# Patient Record
Sex: MALE | Race: WHITE | Employment: OTHER | URBAN - METROPOLITAN AREA
[De-identification: names, ages, dates, MRNs, and addresses within clinical notes are randomized per-mention and may not be internally consistent; named-entity substitution may affect disease eponyms.]

---

## 2023-12-19 ENCOUNTER — APPOINTMENT (OUTPATIENT)
Dept: GENERAL RADIOLOGY | Age: 68
DRG: 871 | End: 2023-12-19
Attending: INTERNAL MEDICINE
Payer: MEDICAID

## 2023-12-19 ENCOUNTER — HOSPITAL ENCOUNTER (INPATIENT)
Age: 68
LOS: 3 days | Discharge: NURSING, REHAB OR LONG-TERM CARE | DRG: 871 | End: 2023-12-22
Attending: INTERNAL MEDICINE | Admitting: INTERNAL MEDICINE
Payer: MEDICAID

## 2023-12-19 DIAGNOSIS — G89.29 OTHER CHRONIC PAIN: Primary | ICD-10-CM

## 2023-12-19 PROBLEM — J96.20 ACUTE ON CHRONIC RESPIRATORY FAILURE (HCC): Status: ACTIVE | Noted: 2023-12-19

## 2023-12-19 LAB
ANION GAP SERPL CALCULATED.3IONS-SCNC: 10 MMOL/L (ref 3–16)
APTT BLD: 34.4 SEC (ref 22.7–35.9)
BASE EXCESS BLDA CALC-SCNC: -3.7 MMOL/L (ref -3–3)
BASOPHILS # BLD: 0.1 K/UL (ref 0–0.2)
BASOPHILS NFR BLD: 0.4 %
BILIRUB UR QL STRIP.AUTO: NEGATIVE
BUN SERPL-MCNC: 30 MG/DL (ref 7–20)
CALCIUM SERPL-MCNC: 8.3 MG/DL (ref 8.3–10.6)
CHLORIDE SERPL-SCNC: 102 MMOL/L (ref 99–110)
CK SERPL-CCNC: 268 U/L (ref 39–308)
CLARITY UR: CLEAR
CO2 BLDA-SCNC: 25.7 MMOL/L
CO2 SERPL-SCNC: 21 MMOL/L (ref 21–32)
COHGB MFR BLDA: 0.8 % (ref 0–1.5)
COLOR UR: YELLOW
CREAT SERPL-MCNC: 0.8 MG/DL (ref 0.8–1.3)
DEPRECATED RDW RBC AUTO: 16.1 % (ref 12.4–15.4)
EOSINOPHIL # BLD: 0 K/UL (ref 0–0.6)
EOSINOPHIL NFR BLD: 0 %
FLUAV RNA RESP QL NAA+PROBE: NOT DETECTED
FLUBV RNA RESP QL NAA+PROBE: NOT DETECTED
GFR SERPLBLD CREATININE-BSD FMLA CKD-EPI: >60 ML/MIN/{1.73_M2}
GLUCOSE BLD-MCNC: 131 MG/DL (ref 70–99)
GLUCOSE BLD-MCNC: 158 MG/DL (ref 70–99)
GLUCOSE SERPL-MCNC: 132 MG/DL (ref 70–99)
GLUCOSE UR STRIP.AUTO-MCNC: NEGATIVE MG/DL
HCO3 BLDA-SCNC: 24 MMOL/L (ref 21–29)
HCT VFR BLD AUTO: 33.3 % (ref 40.5–52.5)
HGB BLD-MCNC: 10.8 G/DL (ref 13.5–17.5)
HGB BLDA-MCNC: 11.9 G/DL (ref 13.5–17.5)
HGB UR QL STRIP.AUTO: NEGATIVE
INR PPP: 1.22 (ref 0.84–1.16)
KETONES UR STRIP.AUTO-MCNC: NEGATIVE MG/DL
LACTATE BLDV-SCNC: 0.9 MMOL/L (ref 0.4–2)
LEUKOCYTE ESTERASE UR QL STRIP.AUTO: NEGATIVE
LYMPHOCYTES # BLD: 0.4 K/UL (ref 1–5.1)
LYMPHOCYTES NFR BLD: 1 %
MCH RBC QN AUTO: 29.7 PG (ref 26–34)
MCHC RBC AUTO-ENTMCNC: 32.3 G/DL (ref 31–36)
MCV RBC AUTO: 91.8 FL (ref 80–100)
METHGB MFR BLDA: 0.2 %
MONOCYTES # BLD: 0.3 K/UL (ref 0–1.3)
MONOCYTES NFR BLD: 0.8 %
NEUTROPHILS # BLD: 34.4 K/UL (ref 1.7–7.7)
NEUTROPHILS NFR BLD: 97.8 %
NITRITE UR QL STRIP.AUTO: NEGATIVE
NT-PROBNP SERPL-MCNC: 674 PG/ML (ref 0–124)
O2 CT VFR BLDA CALC: 16 ML/DL
O2 THERAPY: ABNORMAL
PCO2 BLDA: 55.6 MMHG (ref 35–45)
PERFORMED ON: ABNORMAL
PERFORMED ON: ABNORMAL
PH BLDA: 7.25 [PH] (ref 7.35–7.45)
PH UR STRIP.AUTO: 6 [PH] (ref 5–8)
PLATELET # BLD AUTO: 494 K/UL (ref 135–450)
PMV BLD AUTO: 7.4 FL (ref 5–10.5)
PO2 BLDA: 97 MMHG (ref 75–108)
POTASSIUM SERPL-SCNC: 5.9 MMOL/L (ref 3.5–5.1)
PROCALCITONIN SERPL IA-MCNC: 0.54 NG/ML (ref 0–0.15)
PROT UR STRIP.AUTO-MCNC: NEGATIVE MG/DL
PROTHROMBIN TIME: 15.4 SEC (ref 11.5–14.8)
RBC # BLD AUTO: 3.63 M/UL (ref 4.2–5.9)
SAO2 % BLDA: 96.2 %
SARS-COV-2 RNA RESP QL NAA+PROBE: NOT DETECTED
SODIUM SERPL-SCNC: 133 MMOL/L (ref 136–145)
SP GR UR STRIP.AUTO: 1.02 (ref 1–1.03)
TROPONIN, HIGH SENSITIVITY: 19 NG/L (ref 0–22)
UA COMPLETE W REFLEX CULTURE PNL UR: NORMAL
UA DIPSTICK W REFLEX MICRO PNL UR: NORMAL
URN SPEC COLLECT METH UR: NORMAL
UROBILINOGEN UR STRIP-ACNC: 0.2 E.U./DL
WBC # BLD AUTO: 35.2 K/UL (ref 4–11)

## 2023-12-19 PROCEDURE — 2580000003 HC RX 258

## 2023-12-19 PROCEDURE — 6370000000 HC RX 637 (ALT 250 FOR IP): Performed by: INTERNAL MEDICINE

## 2023-12-19 PROCEDURE — 87040 BLOOD CULTURE FOR BACTERIA: CPT

## 2023-12-19 PROCEDURE — 87641 MR-STAPH DNA AMP PROBE: CPT

## 2023-12-19 PROCEDURE — 80048 BASIC METABOLIC PNL TOTAL CA: CPT

## 2023-12-19 PROCEDURE — 85610 PROTHROMBIN TIME: CPT

## 2023-12-19 PROCEDURE — 36415 COLL VENOUS BLD VENIPUNCTURE: CPT

## 2023-12-19 PROCEDURE — 85025 COMPLETE CBC W/AUTO DIFF WBC: CPT

## 2023-12-19 PROCEDURE — 2700000000 HC OXYGEN THERAPY PER DAY

## 2023-12-19 PROCEDURE — 82803 BLOOD GASES ANY COMBINATION: CPT

## 2023-12-19 PROCEDURE — 87070 CULTURE OTHR SPECIMN AEROBIC: CPT

## 2023-12-19 PROCEDURE — 81003 URINALYSIS AUTO W/O SCOPE: CPT

## 2023-12-19 PROCEDURE — 83880 ASSAY OF NATRIURETIC PEPTIDE: CPT

## 2023-12-19 PROCEDURE — 87449 NOS EACH ORGANISM AG IA: CPT

## 2023-12-19 PROCEDURE — 2580000003 HC RX 258: Performed by: INTERNAL MEDICINE

## 2023-12-19 PROCEDURE — 94761 N-INVAS EAR/PLS OXIMETRY MLT: CPT

## 2023-12-19 PROCEDURE — 6360000002 HC RX W HCPCS

## 2023-12-19 PROCEDURE — 5A09457 ASSISTANCE WITH RESPIRATORY VENTILATION, 24-96 CONSECUTIVE HOURS, CONTINUOUS POSITIVE AIRWAY PRESSURE: ICD-10-PCS | Performed by: INTERNAL MEDICINE

## 2023-12-19 PROCEDURE — 87636 SARSCOV2 & INF A&B AMP PRB: CPT

## 2023-12-19 PROCEDURE — 87205 SMEAR GRAM STAIN: CPT

## 2023-12-19 PROCEDURE — 84145 PROCALCITONIN (PCT): CPT

## 2023-12-19 PROCEDURE — 84484 ASSAY OF TROPONIN QUANT: CPT

## 2023-12-19 PROCEDURE — 71045 X-RAY EXAM CHEST 1 VIEW: CPT

## 2023-12-19 PROCEDURE — 6370000000 HC RX 637 (ALT 250 FOR IP)

## 2023-12-19 PROCEDURE — 85730 THROMBOPLASTIN TIME PARTIAL: CPT

## 2023-12-19 PROCEDURE — 83605 ASSAY OF LACTIC ACID: CPT

## 2023-12-19 PROCEDURE — 80307 DRUG TEST PRSMV CHEM ANLYZR: CPT

## 2023-12-19 PROCEDURE — 93005 ELECTROCARDIOGRAM TRACING: CPT | Performed by: INTERNAL MEDICINE

## 2023-12-19 PROCEDURE — 94640 AIRWAY INHALATION TREATMENT: CPT

## 2023-12-19 PROCEDURE — 94660 CPAP INITIATION&MGMT: CPT

## 2023-12-19 PROCEDURE — 82550 ASSAY OF CK (CPK): CPT

## 2023-12-19 PROCEDURE — 87633 RESP VIRUS 12-25 TARGETS: CPT

## 2023-12-19 PROCEDURE — 2000000000 HC ICU R&B

## 2023-12-19 RX ORDER — OMEPRAZOLE 20 MG/1
20 CAPSULE, DELAYED RELEASE ORAL DAILY
COMMUNITY

## 2023-12-19 RX ORDER — ACETAMINOPHEN 325 MG/1
650 TABLET ORAL EVERY 6 HOURS PRN
Status: DISCONTINUED | OUTPATIENT
Start: 2023-12-19 | End: 2023-12-19

## 2023-12-19 RX ORDER — IPRATROPIUM BROMIDE AND ALBUTEROL SULFATE 2.5; .5 MG/3ML; MG/3ML
1 SOLUTION RESPIRATORY (INHALATION)
Status: DISCONTINUED | OUTPATIENT
Start: 2023-12-20 | End: 2023-12-22 | Stop reason: HOSPADM

## 2023-12-19 RX ORDER — POLYETHYLENE GLYCOL 3350 17 G/17G
17 POWDER, FOR SOLUTION ORAL DAILY PRN
Status: DISCONTINUED | OUTPATIENT
Start: 2023-12-19 | End: 2023-12-22 | Stop reason: HOSPADM

## 2023-12-19 RX ORDER — HYDROCODONE BITARTRATE AND ACETAMINOPHEN 5; 325 MG/1; MG/1
1 TABLET ORAL EVERY 6 HOURS PRN
Status: ON HOLD | COMMUNITY
End: 2023-12-22

## 2023-12-19 RX ORDER — POTASSIUM CHLORIDE 750 MG/1
40 TABLET, EXTENDED RELEASE ORAL PRN
Status: DISCONTINUED | OUTPATIENT
Start: 2023-12-19 | End: 2023-12-22 | Stop reason: HOSPADM

## 2023-12-19 RX ORDER — LISINOPRIL 20 MG/1
20 TABLET ORAL DAILY
COMMUNITY

## 2023-12-19 RX ORDER — FLUTICASONE PROPIONATE AND SALMETEROL XINAFOATE 45; 21 UG/1; UG/1
2 AEROSOL, METERED RESPIRATORY (INHALATION) 2 TIMES DAILY
Status: ON HOLD | COMMUNITY
End: 2023-12-22 | Stop reason: HOSPADM

## 2023-12-19 RX ORDER — HYDROXYZINE PAMOATE 25 MG/1
25 CAPSULE ORAL 3 TIMES DAILY PRN
Status: ON HOLD | COMMUNITY
End: 2023-12-22 | Stop reason: HOSPADM

## 2023-12-19 RX ORDER — MAGNESIUM SULFATE 1 G/100ML
1000 INJECTION INTRAVENOUS PRN
Status: DISCONTINUED | OUTPATIENT
Start: 2023-12-19 | End: 2023-12-22 | Stop reason: HOSPADM

## 2023-12-19 RX ORDER — SODIUM CHLORIDE 9 MG/ML
INJECTION, SOLUTION INTRAVENOUS PRN
Status: DISCONTINUED | OUTPATIENT
Start: 2023-12-19 | End: 2023-12-22 | Stop reason: HOSPADM

## 2023-12-19 RX ORDER — ACETAMINOPHEN 650 MG/1
650 SUPPOSITORY RECTAL EVERY 6 HOURS PRN
Status: DISCONTINUED | OUTPATIENT
Start: 2023-12-19 | End: 2023-12-22 | Stop reason: HOSPADM

## 2023-12-19 RX ORDER — GLUCAGON 1 MG/ML
1 KIT INJECTION PRN
Status: DISCONTINUED | OUTPATIENT
Start: 2023-12-19 | End: 2023-12-22 | Stop reason: HOSPADM

## 2023-12-19 RX ORDER — DIAZEPAM 5 MG/1
5 TABLET ORAL NIGHTLY
Status: ON HOLD | COMMUNITY
End: 2023-12-22 | Stop reason: HOSPADM

## 2023-12-19 RX ORDER — BUDESONIDE AND FORMOTEROL FUMARATE DIHYDRATE 80; 4.5 UG/1; UG/1
2 AEROSOL RESPIRATORY (INHALATION) 2 TIMES DAILY
COMMUNITY

## 2023-12-19 RX ORDER — DEXTROSE MONOHYDRATE 100 MG/ML
INJECTION, SOLUTION INTRAVENOUS CONTINUOUS PRN
Status: DISCONTINUED | OUTPATIENT
Start: 2023-12-19 | End: 2023-12-22 | Stop reason: HOSPADM

## 2023-12-19 RX ORDER — SODIUM CHLORIDE 0.9 % (FLUSH) 0.9 %
10 SYRINGE (ML) INJECTION PRN
Status: DISCONTINUED | OUTPATIENT
Start: 2023-12-19 | End: 2023-12-22 | Stop reason: HOSPADM

## 2023-12-19 RX ORDER — POTASSIUM CHLORIDE 7.45 MG/ML
10 INJECTION INTRAVENOUS PRN
Status: DISCONTINUED | OUTPATIENT
Start: 2023-12-19 | End: 2023-12-22 | Stop reason: HOSPADM

## 2023-12-19 RX ORDER — ONDANSETRON 4 MG/1
4 TABLET, ORALLY DISINTEGRATING ORAL EVERY 8 HOURS PRN
Status: DISCONTINUED | OUTPATIENT
Start: 2023-12-19 | End: 2023-12-22 | Stop reason: HOSPADM

## 2023-12-19 RX ORDER — SODIUM CHLORIDE 0.9 % (FLUSH) 0.9 %
5-40 SYRINGE (ML) INJECTION EVERY 12 HOURS SCHEDULED
Status: DISCONTINUED | OUTPATIENT
Start: 2023-12-19 | End: 2023-12-22 | Stop reason: HOSPADM

## 2023-12-19 RX ORDER — CARISOPRODOL 350 MG/1
350 TABLET ORAL 3 TIMES DAILY PRN
Status: ON HOLD | COMMUNITY
End: 2023-12-22 | Stop reason: HOSPADM

## 2023-12-19 RX ORDER — SODIUM CHLORIDE 9 MG/ML
INJECTION, SOLUTION INTRAVENOUS CONTINUOUS
Status: DISCONTINUED | OUTPATIENT
Start: 2023-12-19 | End: 2023-12-20

## 2023-12-19 RX ORDER — IPRATROPIUM BROMIDE AND ALBUTEROL SULFATE 2.5; .5 MG/3ML; MG/3ML
1 SOLUTION RESPIRATORY (INHALATION)
Status: DISCONTINUED | OUTPATIENT
Start: 2023-12-19 | End: 2023-12-19

## 2023-12-19 RX ORDER — ONDANSETRON 2 MG/ML
4 INJECTION INTRAMUSCULAR; INTRAVENOUS EVERY 6 HOURS PRN
Status: DISCONTINUED | OUTPATIENT
Start: 2023-12-19 | End: 2023-12-22 | Stop reason: HOSPADM

## 2023-12-19 RX ORDER — ENOXAPARIN SODIUM 100 MG/ML
30 INJECTION SUBCUTANEOUS DAILY
Status: DISCONTINUED | OUTPATIENT
Start: 2023-12-19 | End: 2023-12-22 | Stop reason: HOSPADM

## 2023-12-19 RX ORDER — ALBUTEROL SULFATE 4 MG/1
4 TABLET ORAL 3 TIMES DAILY
Status: ON HOLD | COMMUNITY
End: 2023-12-22 | Stop reason: HOSPADM

## 2023-12-19 RX ADMIN — SODIUM CHLORIDE: 9 INJECTION, SOLUTION INTRAVENOUS at 20:22

## 2023-12-19 RX ADMIN — Medication 10 ML: at 22:34

## 2023-12-19 RX ADMIN — IPRATROPIUM BROMIDE AND ALBUTEROL SULFATE 1 DOSE: 2.5; .5 SOLUTION RESPIRATORY (INHALATION) at 19:56

## 2023-12-19 RX ADMIN — IPRATROPIUM BROMIDE AND ALBUTEROL SULFATE 1 DOSE: 2.5; .5 SOLUTION RESPIRATORY (INHALATION) at 23:20

## 2023-12-19 RX ADMIN — INSULIN HUMAN 5 UNITS: 100 INJECTION, SOLUTION PARENTERAL at 20:23

## 2023-12-19 RX ADMIN — ENOXAPARIN SODIUM 30 MG: 100 INJECTION SUBCUTANEOUS at 22:21

## 2023-12-19 RX ADMIN — METHYLPREDNISOLONE SODIUM SUCCINATE 40 MG: 40 INJECTION INTRAMUSCULAR; INTRAVENOUS at 22:22

## 2023-12-19 RX ADMIN — DEXTROSE MONOHYDRATE 250 ML: 100 INJECTION, SOLUTION INTRAVENOUS at 20:28

## 2023-12-19 NOTE — PLAN OF CARE
Admit from [facility] ER -> unsure of arrival time    AMS  Septic shock  Acute on chronic respiratory failure  COPD AE  Pneumonia   - pt was found by EMS slumped over and hypotensive   - pt was reportedly 70s on RA in EMS -> was placed on CPAP -> now on BiPAP in ER (reportedly uses O2 as needed at home)  - BP has reportedly gone up to 120s with IVF, but now currently in 90s despite being given 3L IVF. ER doc states she will place line if needed but currently no central line in place.  - CXR shows bronchitis and possible atypical pna   - WBC 38K  - ABG with CO2 56.7 and pH 7.30  - given steroids, breathing tx, and broad spectrum abx in ER    JONATHAN  - Cr 1.5 in ER    Vitals  - O2 sats in 90s  - BP in 90s  - HR 99    Note: this was not mentioned in discussion with ER doc, but the intake shows that pt needs isolation for bed bugs. Nursing communication in for med rec. Home meds not reconciled or reordered.     Admit ICU    Alice Woods PA-C  12/19/23 5:39 PM

## 2023-12-20 PROBLEM — J44.1 COPD WITH ACUTE EXACERBATION (HCC): Status: ACTIVE | Noted: 2023-12-20

## 2023-12-20 PROBLEM — E43 SEVERE PROTEIN-CALORIE MALNUTRITION (HCC): Chronic | Status: ACTIVE | Noted: 2023-12-20

## 2023-12-20 PROBLEM — G89.29 OTHER CHRONIC PAIN: Status: ACTIVE | Noted: 2023-12-20

## 2023-12-20 PROBLEM — G93.41 METABOLIC ENCEPHALOPATHY: Status: ACTIVE | Noted: 2023-12-20

## 2023-12-20 LAB
AMPHETAMINES UR QL SCN>1000 NG/ML: ABNORMAL
ANION GAP SERPL CALCULATED.3IONS-SCNC: 7 MMOL/L (ref 3–16)
BARBITURATES UR QL SCN>200 NG/ML: ABNORMAL
BASE EXCESS BLDA CALC-SCNC: -2.6 MMOL/L (ref -3–3)
BASOPHILS # BLD: 0 K/UL (ref 0–0.2)
BASOPHILS NFR BLD: 0 %
BENZODIAZ UR QL SCN>200 NG/ML: POSITIVE
BUN SERPL-MCNC: 24 MG/DL (ref 7–20)
CALCIUM SERPL-MCNC: 7.8 MG/DL (ref 8.3–10.6)
CANNABINOIDS UR QL SCN>50 NG/ML: POSITIVE
CHLORIDE SERPL-SCNC: 104 MMOL/L (ref 99–110)
CO2 BLDA-SCNC: 24.7 MMOL/L
CO2 SERPL-SCNC: 21 MMOL/L (ref 21–32)
COCAINE UR QL SCN: ABNORMAL
COHGB MFR BLDA: 0.1 % (ref 0–1.5)
CREAT SERPL-MCNC: <0.5 MG/DL (ref 0.8–1.3)
DEPRECATED RDW RBC AUTO: 15.9 % (ref 12.4–15.4)
DRUG SCREEN COMMENT UR-IMP: ABNORMAL
EKG ATRIAL RATE: 83 BPM
EKG DIAGNOSIS: NORMAL
EKG P AXIS: 84 DEGREES
EKG P-R INTERVAL: 118 MS
EKG Q-T INTERVAL: 390 MS
EKG QRS DURATION: 88 MS
EKG QTC CALCULATION (BAZETT): 458 MS
EKG R AXIS: 89 DEGREES
EKG T AXIS: 53 DEGREES
EKG VENTRICULAR RATE: 83 BPM
EOSINOPHIL # BLD: 0 K/UL (ref 0–0.6)
EOSINOPHIL NFR BLD: 0 %
FENTANYL SCREEN, URINE: ABNORMAL
GFR SERPLBLD CREATININE-BSD FMLA CKD-EPI: >60 ML/MIN/{1.73_M2}
GLUCOSE BLD-MCNC: 129 MG/DL (ref 70–99)
GLUCOSE BLD-MCNC: 146 MG/DL (ref 70–99)
GLUCOSE BLD-MCNC: 153 MG/DL (ref 70–99)
GLUCOSE SERPL-MCNC: 131 MG/DL (ref 70–99)
HCO3 BLDA-SCNC: 23.3 MMOL/L (ref 21–29)
HCT VFR BLD AUTO: 29.2 % (ref 40.5–52.5)
HGB BLD-MCNC: 9.5 G/DL (ref 13.5–17.5)
HGB BLDA-MCNC: 10.5 G/DL (ref 13.5–17.5)
LACTATE BLDV-SCNC: 0.6 MMOL/L (ref 0.4–2)
LYMPHOCYTES # BLD: 0.6 K/UL (ref 1–5.1)
LYMPHOCYTES NFR BLD: 2.1 %
MCH RBC QN AUTO: 29.5 PG (ref 26–34)
MCHC RBC AUTO-ENTMCNC: 32.5 G/DL (ref 31–36)
MCV RBC AUTO: 90.5 FL (ref 80–100)
METHADONE UR QL SCN>300 NG/ML: ABNORMAL
METHGB MFR BLDA: 0.3 %
MONOCYTES # BLD: 0.4 K/UL (ref 0–1.3)
MONOCYTES NFR BLD: 1.3 %
MRSA DNA SPEC QL NAA+PROBE: NORMAL
NEUTROPHILS # BLD: 27.9 K/UL (ref 1.7–7.7)
NEUTROPHILS NFR BLD: 96.6 %
O2 THERAPY: ABNORMAL
OPIATES UR QL SCN>300 NG/ML: POSITIVE
ORGANISM: ABNORMAL
OXYCODONE UR QL SCN: ABNORMAL
PCO2 BLDA: 45.1 MMHG (ref 35–45)
PCP UR QL SCN>25 NG/ML: ABNORMAL
PERFORMED ON: ABNORMAL
PH BLDA: 7.33 [PH] (ref 7.35–7.45)
PH UR STRIP: 6 [PH]
PLATELET # BLD AUTO: 459 K/UL (ref 135–450)
PMV BLD AUTO: 7.4 FL (ref 5–10.5)
PNEUMONIA PANEL MOLECULAR: ABNORMAL
PO2 BLDA: 78.8 MMHG (ref 75–108)
POTASSIUM SERPL-SCNC: 5.1 MMOL/L (ref 3.5–5.1)
RBC # BLD AUTO: 3.22 M/UL (ref 4.2–5.9)
REPORT: NORMAL
SAO2 % BLDA: 94.8 %
SODIUM SERPL-SCNC: 132 MMOL/L (ref 136–145)
WBC # BLD AUTO: 28.9 K/UL (ref 4–11)

## 2023-12-20 PROCEDURE — 83605 ASSAY OF LACTIC ACID: CPT

## 2023-12-20 PROCEDURE — 2580000003 HC RX 258: Performed by: INTERNAL MEDICINE

## 2023-12-20 PROCEDURE — 36415 COLL VENOUS BLD VENIPUNCTURE: CPT

## 2023-12-20 PROCEDURE — 6360000002 HC RX W HCPCS: Performed by: INTERNAL MEDICINE

## 2023-12-20 PROCEDURE — 94761 N-INVAS EAR/PLS OXIMETRY MLT: CPT

## 2023-12-20 PROCEDURE — 82803 BLOOD GASES ANY COMBINATION: CPT

## 2023-12-20 PROCEDURE — 93010 ELECTROCARDIOGRAM REPORT: CPT | Performed by: INTERNAL MEDICINE

## 2023-12-20 PROCEDURE — 94660 CPAP INITIATION&MGMT: CPT

## 2023-12-20 PROCEDURE — 51702 INSERT TEMP BLADDER CATH: CPT

## 2023-12-20 PROCEDURE — 80048 BASIC METABOLIC PNL TOTAL CA: CPT

## 2023-12-20 PROCEDURE — 2580000003 HC RX 258

## 2023-12-20 PROCEDURE — 6370000000 HC RX 637 (ALT 250 FOR IP): Performed by: INTERNAL MEDICINE

## 2023-12-20 PROCEDURE — 99291 CRITICAL CARE FIRST HOUR: CPT | Performed by: INTERNAL MEDICINE

## 2023-12-20 PROCEDURE — 85025 COMPLETE CBC W/AUTO DIFF WBC: CPT

## 2023-12-20 PROCEDURE — 94640 AIRWAY INHALATION TREATMENT: CPT

## 2023-12-20 PROCEDURE — 99233 SBSQ HOSP IP/OBS HIGH 50: CPT | Performed by: INTERNAL MEDICINE

## 2023-12-20 PROCEDURE — 6360000002 HC RX W HCPCS

## 2023-12-20 PROCEDURE — 1200000000 HC SEMI PRIVATE

## 2023-12-20 PROCEDURE — 2700000000 HC OXYGEN THERAPY PER DAY

## 2023-12-20 RX ORDER — 0.9 % SODIUM CHLORIDE 0.9 %
500 INTRAVENOUS SOLUTION INTRAVENOUS ONCE
Status: COMPLETED | OUTPATIENT
Start: 2023-12-20 | End: 2023-12-20

## 2023-12-20 RX ORDER — NICOTINE 21 MG/24HR
1 PATCH, TRANSDERMAL 24 HOURS TRANSDERMAL DAILY
Status: DISCONTINUED | OUTPATIENT
Start: 2023-12-20 | End: 2023-12-22 | Stop reason: HOSPADM

## 2023-12-20 RX ORDER — HYDROCODONE BITARTRATE AND ACETAMINOPHEN 5; 325 MG/1; MG/1
1 TABLET ORAL EVERY 6 HOURS PRN
Status: DISCONTINUED | OUTPATIENT
Start: 2023-12-20 | End: 2023-12-22 | Stop reason: HOSPADM

## 2023-12-20 RX ORDER — IPRATROPIUM BROMIDE AND ALBUTEROL SULFATE 2.5; .5 MG/3ML; MG/3ML
1 SOLUTION RESPIRATORY (INHALATION) EVERY 4 HOURS PRN
Status: DISCONTINUED | OUTPATIENT
Start: 2023-12-20 | End: 2023-12-22 | Stop reason: HOSPADM

## 2023-12-20 RX ADMIN — MUPIROCIN: 20 OINTMENT TOPICAL at 21:05

## 2023-12-20 RX ADMIN — AZITHROMYCIN MONOHYDRATE 500 MG: 500 INJECTION, POWDER, LYOPHILIZED, FOR SOLUTION INTRAVENOUS at 09:41

## 2023-12-20 RX ADMIN — Medication 10 ML: at 08:53

## 2023-12-20 RX ADMIN — PIPERACILLIN AND TAZOBACTAM 3375 MG: 3; .375 INJECTION, POWDER, FOR SOLUTION INTRAVENOUS at 08:48

## 2023-12-20 RX ADMIN — SODIUM CHLORIDE 500 ML: 9 INJECTION, SOLUTION INTRAVENOUS at 02:51

## 2023-12-20 RX ADMIN — IPRATROPIUM BROMIDE AND ALBUTEROL SULFATE 1 DOSE: 2.5; .5 SOLUTION RESPIRATORY (INHALATION) at 19:50

## 2023-12-20 RX ADMIN — PIPERACILLIN AND TAZOBACTAM 3375 MG: 3; .375 INJECTION, POWDER, FOR SOLUTION INTRAVENOUS at 00:23

## 2023-12-20 RX ADMIN — SODIUM CHLORIDE: 9 INJECTION, SOLUTION INTRAVENOUS at 09:10

## 2023-12-20 RX ADMIN — CEFTRIAXONE SODIUM 1000 MG: 1 INJECTION, POWDER, FOR SOLUTION INTRAMUSCULAR; INTRAVENOUS at 09:43

## 2023-12-20 RX ADMIN — SODIUM CHLORIDE: 9 INJECTION, SOLUTION INTRAVENOUS at 00:22

## 2023-12-20 RX ADMIN — METHYLPREDNISOLONE SODIUM SUCCINATE 40 MG: 40 INJECTION INTRAMUSCULAR; INTRAVENOUS at 21:05

## 2023-12-20 RX ADMIN — IPRATROPIUM BROMIDE AND ALBUTEROL SULFATE 1 DOSE: 2.5; .5 SOLUTION RESPIRATORY (INHALATION) at 11:54

## 2023-12-20 RX ADMIN — ENOXAPARIN SODIUM 30 MG: 100 INJECTION SUBCUTANEOUS at 08:48

## 2023-12-20 RX ADMIN — IPRATROPIUM BROMIDE AND ALBUTEROL SULFATE 1 DOSE: 2.5; .5 SOLUTION RESPIRATORY (INHALATION) at 23:33

## 2023-12-20 RX ADMIN — VANCOMYCIN HYDROCHLORIDE 1250 MG: 10 INJECTION, POWDER, LYOPHILIZED, FOR SOLUTION INTRAVENOUS at 05:17

## 2023-12-20 RX ADMIN — IPRATROPIUM BROMIDE AND ALBUTEROL SULFATE 1 DOSE: 2.5; .5 SOLUTION RESPIRATORY (INHALATION) at 15:23

## 2023-12-20 RX ADMIN — MUPIROCIN: 20 OINTMENT TOPICAL at 09:41

## 2023-12-20 RX ADMIN — IPRATROPIUM BROMIDE AND ALBUTEROL SULFATE 1 DOSE: 2.5; .5 SOLUTION RESPIRATORY (INHALATION) at 07:28

## 2023-12-20 RX ADMIN — METHYLPREDNISOLONE SODIUM SUCCINATE 40 MG: 40 INJECTION INTRAMUSCULAR; INTRAVENOUS at 08:48

## 2023-12-20 NOTE — ACP (ADVANCE CARE PLANNING)
Advance Care Planning     General Advance Care Planning (ACP) Conversation    Date of Conversation: 12/19/2023  Conducted with: Patient unable to have POA conversation. Healthcare Decision Maker:  No healthcare decision makers have been documented. Click here to complete Darrell Gracia including selection of the Healthcare Decision Maker Relationship (ie \"Primary\")   Today spoke with a family member and advised that the legal POA would be the mother or father. Both parents are living.      Content/Action Overview    Reviewed DNR/DNI and patient         Length of Voluntary ACP Conversation in minutes:  <16 minutes (Non-Billable)    Marly Swain RN

## 2023-12-20 NOTE — PROGRESS NOTES
Pt unable to urinate. Bladder scan = 748ml. Order for serrato catheter obtained and 16fr serrato placed at this time using sterile technique. Patent with stat lock. Pt tolerated well. 975ml of viktoria, clear urine output.

## 2023-12-20 NOTE — FLOWSHEET NOTE
12/20/23 0330   Vitals   Pulse 69   Respirations 13   BP (!) 103/53   MAP (Calculated) 70   MAP (mmHg) 68   Oxygen Therapy   SpO2 96 %     500ml NS bolus completed at this time, BP above. AM CBC, BMP sent per MD request. MD updated at this time, no new orders.

## 2023-12-20 NOTE — PROGRESS NOTES
Shift assessment, completed, see flow sheet. Pt is alert and oriented x 2-3. Following commands. Repeatedly stating \"I'm not sick, I want to go home. \"     SR 43, /52 (68), SpO2 98% on RA. Respirations are easy, even, and unlabored. Bilateral lung sounds diminished. PIV, WNL with NS 75 ml/hr and zosyn infusing. Sykes in place and patent with STAT lock. Call light within reach. Bed in lowest position. Bed alarm on.

## 2023-12-20 NOTE — PROGRESS NOTES
4 Eyes Skin Assessment     NAME:  Belinda Chairez  YOB: 1955  MEDICAL RECORD NUMBER:  [id number]    The patient is being assessed for  Admission    I agree that at least one RN has performed a thorough Head to Toe Skin Assessment on the patient. ALL assessment sites listed below have been assessed. Areas assessed by both nurses:    Head, Face, Ears, Shoulders, Back, Chest, Arms, Elbows, Hands, Sacrum. Buttock, Coccyx, Ischium, Legs. Feet and Heels, and Under Medical Devices    Scattered bruising and small scabs noted    Does the Patient have a Wound?  No noted wound(s)       Daniel Prevention initiated by RN: Yes  Wound Care Orders initiated by RN: No    Pressure Injury (Stage 3,4, Unstageable, DTI, NWPT, and Complex wounds) if present, place Wound referral order by RN under : No    New Ostomies, if present place, Ostomy referral order under : No     Nurse 1 eSignature: Electronically signed by Ra Longo RN on 12/20/23 at 12:25 AM EST    **SHARE this note so that the co-signing nurse can place an eSignature**    Nurse 2 eSignature: Electronically signed by Ha Nixon RN on 12/20/23 at 5:03 AM EST

## 2023-12-20 NOTE — PROGRESS NOTES
Telemetry Assignment Communication Form    Patient has orders for continuous telemetry OR pulse oximeter only orders    To be filled out by Clinical    Patient has Admission or Transfer orders and is assigned to 224      (Once this top section is completed:  Select \"Route\" and send note to Fax number: [phone]))      ___________________________________________________________________________      [facility]    Patient assigned to tele box number: ___________[id number]_______               (to be written in by [address] when telemetry box is assigned to patient)    ___________________________________________________________________________      Bedside RN confirming that the box listed above is in fact the telemetry box number being placed on the patient listed above.         X________________________________________ RN signature        __________________________________________RN assigned to Patient (please print)    _______________ Date    ____________ Time

## 2023-12-20 NOTE — PROGRESS NOTES
Comprehensive Nutrition Assessment    Type and Reason for Visit:  Initial (no consult or + screen - patient is severely malnourished)    Nutrition Recommendations/Plan:   Continue ADULT DIET; Regular; Low Potassium diet order. Added Ensure Compact with meals. Monitor appetite, meal intake, and acceptance/intake of ONS. Monitor nutrition-related labs, bowel function, and weight trends. Malnutrition Assessment:  Malnutrition Status:  Severe malnutrition (12/20/23 1030)    Context:  Chronic Illness     Findings of the 6 clinical characteristics of malnutrition:  Energy Intake:  75% or less estimated energy requirements for 1 month or longer  Weight Loss:  Unable to assess     Body Fat Loss:  Severe body fat loss Orbital, Triceps, Buccal region   Muscle Mass Loss:  Severe muscle mass loss Temples (temporalis), Clavicles (pectoralis & deltoids), Thigh (quadraceps), Calf (gastrocnemius), Hand (interosseous), Scapula (trapezius)  Fluid Accumulation:  No significant fluid accumulation     Strength:  Not Performed    Nutrition Assessment:    patient is nutritionally compromised AEB patient reported that he \"doesn't like food\" and doesn't eat a lot + patient presented from [facility] after he was found unresponsive in his home and he is at risk for further compromise d/t fat/muscle loss, underweight status, he consumed 0% breakfast this am, and altered nutrition-related labs; will continue ADULT DIET;  Regular; Low Potassium diet order and add Ensure Compact with meals    Nutrition Related Findings:    patient is A & O x 3; he wants to go home; patient presented to [facility] from [facility] after he was found unresponsive and with hypotension at home PTA; + tox screen for benzos, cannabinoid, and opiates; NS at 75 ml/hr; no documented BM for this admission; patient consumed 0% of breakfast and reported that he \"doesn't like food\"; + heavy smoker; patient told his RN that he \"isn't sick\" Wound Type:

## 2023-12-20 NOTE — CARE COORDINATION
12/20/23 1731   Service Assessment   Patient Orientation Other (see comment)  (confused)   Cognition Other (see comment)  (confused)   History Provided By   (Spoke with grandson Sadaf. Wife Daniela gave permission.)   Support Systems Family Members;Parent   Patient's Healthcare Decision Maker is: Legal Next of Kin   PCP Verified by CM Yes  Aris Hadley MD - [place] [id number])   Last Visit to PCP Within last 3 months   Prior Functional Level Independent in ADLs/IADLs   Current Functional Level Independent in ADLs/IADLs   Can patient return to prior living arrangement Yes   Ability to make needs known: Good  (at baseline)   Family able to assist with home care needs: Yes   Would you like for me to discuss the discharge plan with any other family members/significant others, and if so, who? No   Financial Resources Medicaid  (Medicaid [place])   Community Resources None   Discharge Planning   Type of Residence Trailer/Mobile Home   Living Arrangements Spouse/Significant Other;Family Members   Current Services Prior To Admission Meals On Wheels   Potential Assistance Needed N/A   DME Ordered? No   Potential Assistance Purchasing Medications No   Type of Home Care Services None   Patient expects to be discharged to: Trailer/mobile home   Follow Up Appointment: Best Day/Time    (self)   One/Two Story Residence One story   History of falls? 0     Case Management Assessment  Initial Evaluation    Date/Time of Evaluation: 12/20/2023 5:39 PM  Assessment Completed by: Kristie Keen RN    If patient is discharged prior to next notation, then this note serves as note for discharge by case management.     Patient Name: Daphne Berumen                   YOB: 1955  Diagnosis: Acute on chronic respiratory failure ([address]) [J96.20]                   Date / Time: 12/19/2023  7:23 PM    Patient Admission Status: Inpatient   Readmission Risk (Low < 19, Mod (19-27), High > 27): Readmission Risk Score: 11.8    Current PCP: No achievement of predicted outcomes: Family support    Barriers to discharge: none    Additional Case Management Notes: Spoke with Regina Howard - nephew of the patient. Permission given by Daniela Heck/spouse of patient. The patient is not confused at baseline. The patient lives with his mother. The patient was unable to answer Regina Howard and an ambulance was called. The patient has [facility]. The family expects the patient to return to home at [place]. The family advised a family member will be able to pick the patient up at discharge. The Plan for Transition of Care is related to the following treatment goals of Acute on chronic respiratory failure (HCC) [N14.68]    IF APPLICABLE: The Patient and/or patient representative Tez Reagan and his family were provided with a choice of provider and agrees with the discharge plan. Freedom of choice list with basic dialogue that supports the patient's individualized plan of care/goals and shares the quality data associated with the providers was provided to:     Patient Representative Name:       The Patient and/or Patient Representative Agree with the Discharge Plan?       Shanelle Lindsey RN  Case Management Department  Ph: [phone]

## 2023-12-20 NOTE — PROGRESS NOTES
12/20/23 0341   NIV Type   NIV Started/Stopped On   Equipment Type v60   Mode Bilevel   Mask Type Full face mask   Mask Size Medium   Assessment   Pulse 83   Respirations 13   SpO2 98 %   Settings/Measurements   IPAP 18 cmH20   CPAP/EPAP 8 cmH2O   Vt (Measured) 612 mL   Rate Ordered 16   FiO2  30 %   I Time/ I Time % 0.9 s   Minute Volume (L/min) 18 Liters   Mask Leak (lpm) 21 lpm   Patient's Home Machine No

## 2023-12-20 NOTE — FLOWSHEET NOTE
12/20/23 0231   Vitals   Pulse 75   Respirations 16   BP (!) 87/52   MAP (Calculated) 64   MAP (mmHg) (!) 64   Oxygen Therapy   SpO2 96 %     MD notified via [email or web address] of BP.  No new orders at this time

## 2023-12-20 NOTE — PROGRESS NOTES
12/19/23 1841   NIV Type   NIV Started/Stopped On   Equipment Type v60   Mode Bilevel   Mask Type Full face mask   Mask Size Medium   Assessment   Pulse 82   Respirations 17   SpO2 97 %   Settings/Measurements   IPAP 18 cmH20   CPAP/EPAP 8 cmH2O   Vt (Measured) 850 mL   Rate Ordered 16   FiO2  30 %   I Time/ I Time % 0.9 s   Minute Volume (L/min) 12 Liters   Mask Leak (lpm) 1 lpm   Patient's Home Machine No

## 2023-12-20 NOTE — PROGRESS NOTES
Care rounds completed with Dr. Elba Grissom and multidisciplinary team. Reviewed labs, meds, VS, assessment, & plan of care for today. See dictated note and new orders for details.       DC vanc and zosyn  Start azithromycin and ceftriaxone   Bipap HS and PRN  DC IVF

## 2023-12-20 NOTE — PROGRESS NOTES
Pt arrived via transport and admitted to ICU from [facility]. Admission assessment, completed, see flow sheet. Pt is alert and oriented to person, place, and time. Assessment questions completed with patient and pt's sister Briseida Rodriges. Family members at bedside. On BiPAP  18/8 30%. SR 87, /87, SpO2 98%. Respirations are easy, even, and unlabored. Bilateral lung sounds with upper rhonchi. PIV x2 WNL with NS infusing at 100. K+ level of 5.9, insulin and dextrose ordered and given. Call light within reach. Bed in lowest position. Bed alarm on.

## 2023-12-20 NOTE — CONSULTS
Reason for referral and CC: SOB, COPD    HISTORY OF PRESENT ILLNESS: 81 yo male with a h/o COPD on home O2 was transferred from [facility] for SOB and COPD exacerbation and possible pneumonia. Required Bipap for elevated CO2 and was admitted here to  ICU. The pt had been found somnoent in his home. OSH labs: WBC 38, K+ 5.9, Cr 1.5, BUN 33, /56.7/129, LA acid 1.5  At the outside hospital patient received DuoNeb 3 L of normal saline bolus  Empirical antibiotic vancomycin and cefepime  Still not completely oriented - not sure of baseline  Past Medical History:   Diagnosis Date    COPD (chronic obstructive pulmonary disease) ([facility])      Past Surgical History:   Procedure Laterality Date    CORONARY STENT PLACEMENT       Family History  family history is not on file. Social History:  reports that he has been smoking cigarettes. He started smoking about 51 years ago. He has a 203.9 pack-year smoking history. He has never used smokeless tobacco.   reports that he does not currently use alcohol. ALLERGIES:  Patient has No Known Allergies.   Continuous Infusions:   sodium chloride Stopped (12/20/23 0023)    sodium chloride 100 mL/hr at 12/19/23 2346    dextrose       Scheduled Meds:   sodium chloride flush  5-40 mL IntraVENous 2 times per day    enoxaparin  30 mg SubCUTAneous Daily    methylPREDNISolone  40 mg IntraVENous BID    vancomycin  1,250 mg IntraVENous Q24H    piperacillin-tazobactam  3,375 mg IntraVENous Q8H    ipratropium 0.5 mg-albuterol 2.5 mg  1 Dose Inhalation Q4H WA RT     PRN Meds:  ipratropium 0.5 mg-albuterol 2.5 mg, sodium chloride flush, sodium chloride, potassium chloride **OR** potassium alternative oral replacement **OR** potassium chloride, magnesium sulfate, ondansetron **OR** ondansetron, polyethylene glycol, [DISCONTINUED] acetaminophen **OR** acetaminophen, glucose, dextrose bolus **OR** dextrose bolus, glucagon (rDNA), dextrose    REVIEW OF SYSTEMS:  Constitutional: Negative

## 2023-12-20 NOTE — PROGRESS NOTES
Reassessment completed, see flow sheet. BL lungs clear with L upper expiratory wheezing. Pt continues to be A/O x 3 and on BiPAP. NS currently infusing. All ICU Lines and monitoring remain in place. Bed locked in lowest position. Call light within reach.

## 2023-12-20 NOTE — H&P
V2.0  History and Physical      Name:  Alize Ty /Age/Sex: 1955  (80 y.o. male)   MRN & CSN:  [id number] & [id number] Encounter Date/Time: 2023 8:23 PM EST   Location:  [facility] PCP: No primary care provider on file. Hospital Day: 1    Assessment and Plan:   Alize Ty is a 80 y.o. male with a pmh of COPD on oxygen as needed who presents with Acute on chronic respiratory failure [facility]    Hospital Problems             Last Modified POA    * (Principal) Acute on chronic respiratory failure ([address]) 2023 Yes       Plan:  Acute on chronic hypoxic hypercapnic respiratory failure  -Chronic component due to COPD patient is using oxygen as needed and he is active smoker  Acute component due to COPD exacerbation and possible community-acquired pneumonia  2. Toxic metabolic encephalopathy  -Due to CO2 narcosis and acute kidney injury  3. Acute kidney injury  4. Hyperkalemia  -Baseline unknown  -Due to possible  -Admit to telemetry  -Empirical antibiotic vancomycin and Zosyn  -Blood culture  -Prednisone 40 mg daily  -Continue with BiPAP, monitor respiratory status and VBG  -Judicious hydration monitor electrolyte and lactate      Disposition:   Current Living situation: Home  Expected Disposition: Home  Estimated D/C: 2 days    Diet Diet NPO   DVT Prophylaxis [x] Lovenox, []  Heparin, [] SCDs, [] Ambulation,  [] Eliquis, [] Xarelto, [] Coumadin   Code Status Full Code   Surrogate Decision Maker/ POA Self     Personally reviewed Lab Studies and Imaging     Discussed management of the case with ED physician who recommended admission    EKG interpreted personally and results ; Normal sinus rhythmBiatrial enlargementT wave abnormality     Imaging that was interpreted personally includes chest x-ray and result hyperinflation no parenchymal abnormalities      History from:     family member -son and daughter    History of Present Illness:     Chief Complaint: Shortness of breath  Alize Ty is a 80

## 2023-12-21 PROBLEM — Z72.0 TOBACCO ABUSE: Status: ACTIVE | Noted: 2023-12-21

## 2023-12-21 PROBLEM — J18.9 COMMUNITY ACQUIRED PNEUMONIA: Status: ACTIVE | Noted: 2023-12-21

## 2023-12-21 PROBLEM — J13 PNEUMONIA DUE TO STREPTOCOCCUS PNEUMONIAE (HCC): Status: ACTIVE | Noted: 2023-12-21

## 2023-12-21 LAB
ANION GAP SERPL CALCULATED.3IONS-SCNC: 6 MMOL/L (ref 3–16)
ANION GAP SERPL CALCULATED.3IONS-SCNC: 7 MMOL/L (ref 3–16)
BACTERIA SPEC RESP CULT: NORMAL
BASOPHILS # BLD: 0 K/UL (ref 0–0.2)
BASOPHILS NFR BLD: 0.1 %
BUN SERPL-MCNC: 17 MG/DL (ref 7–20)
BUN SERPL-MCNC: 17 MG/DL (ref 7–20)
CALCIUM SERPL-MCNC: 8.3 MG/DL (ref 8.3–10.6)
CALCIUM SERPL-MCNC: 8.6 MG/DL (ref 8.3–10.6)
CHLORIDE SERPL-SCNC: 102 MMOL/L (ref 99–110)
CHLORIDE SERPL-SCNC: 99 MMOL/L (ref 99–110)
CO2 SERPL-SCNC: 20 MMOL/L (ref 21–32)
CO2 SERPL-SCNC: 29 MMOL/L (ref 21–32)
CREAT SERPL-MCNC: <0.5 MG/DL (ref 0.8–1.3)
CREAT SERPL-MCNC: <0.5 MG/DL (ref 0.8–1.3)
DEPRECATED RDW RBC AUTO: 16.9 % (ref 12.4–15.4)
EOSINOPHIL # BLD: 0 K/UL (ref 0–0.6)
EOSINOPHIL NFR BLD: 0 %
GFR SERPLBLD CREATININE-BSD FMLA CKD-EPI: >60 ML/MIN/{1.73_M2}
GFR SERPLBLD CREATININE-BSD FMLA CKD-EPI: >60 ML/MIN/{1.73_M2}
GLUCOSE SERPL-MCNC: 126 MG/DL (ref 70–99)
GLUCOSE SERPL-MCNC: 190 MG/DL (ref 70–99)
GRAM STN SPEC: NORMAL
HCT VFR BLD AUTO: 34.4 % (ref 40.5–52.5)
HGB BLD-MCNC: 10.8 G/DL (ref 13.5–17.5)
LEGIONELLA AG UR QL: NORMAL
LYMPHOCYTES # BLD: 0.6 K/UL (ref 1–5.1)
LYMPHOCYTES NFR BLD: 3.7 %
MCH RBC QN AUTO: 29.6 PG (ref 26–34)
MCHC RBC AUTO-ENTMCNC: 31.4 G/DL (ref 31–36)
MCV RBC AUTO: 94.3 FL (ref 80–100)
MONOCYTES # BLD: 0.6 K/UL (ref 0–1.3)
MONOCYTES NFR BLD: 3.2 %
NEUTROPHILS # BLD: 15.9 K/UL (ref 1.7–7.7)
NEUTROPHILS NFR BLD: 93 %
PLATELET # BLD AUTO: 335 K/UL (ref 135–450)
PMV BLD AUTO: 7.4 FL (ref 5–10.5)
POTASSIUM SERPL-SCNC: 4.5 MMOL/L (ref 3.5–5.1)
POTASSIUM SERPL-SCNC: 5.3 MMOL/L (ref 3.5–5.1)
RBC # BLD AUTO: 3.65 M/UL (ref 4.2–5.9)
S PNEUM AG UR QL: NORMAL
SODIUM SERPL-SCNC: 128 MMOL/L (ref 136–145)
SODIUM SERPL-SCNC: 135 MMOL/L (ref 136–145)
WBC # BLD AUTO: 17.1 K/UL (ref 4–11)

## 2023-12-21 PROCEDURE — 94761 N-INVAS EAR/PLS OXIMETRY MLT: CPT

## 2023-12-21 PROCEDURE — 6360000002 HC RX W HCPCS: Performed by: INTERNAL MEDICINE

## 2023-12-21 PROCEDURE — 36415 COLL VENOUS BLD VENIPUNCTURE: CPT

## 2023-12-21 PROCEDURE — 97166 OT EVAL MOD COMPLEX 45 MIN: CPT

## 2023-12-21 PROCEDURE — 94010 BREATHING CAPACITY TEST: CPT

## 2023-12-21 PROCEDURE — 97530 THERAPEUTIC ACTIVITIES: CPT

## 2023-12-21 PROCEDURE — 99233 SBSQ HOSP IP/OBS HIGH 50: CPT | Performed by: INTERNAL MEDICINE

## 2023-12-21 PROCEDURE — 2580000003 HC RX 258: Performed by: INTERNAL MEDICINE

## 2023-12-21 PROCEDURE — 85025 COMPLETE CBC W/AUTO DIFF WBC: CPT

## 2023-12-21 PROCEDURE — 97116 GAIT TRAINING THERAPY: CPT

## 2023-12-21 PROCEDURE — 94640 AIRWAY INHALATION TREATMENT: CPT

## 2023-12-21 PROCEDURE — 6370000000 HC RX 637 (ALT 250 FOR IP): Performed by: INTERNAL MEDICINE

## 2023-12-21 PROCEDURE — 51702 INSERT TEMP BLADDER CATH: CPT

## 2023-12-21 PROCEDURE — 80048 BASIC METABOLIC PNL TOTAL CA: CPT

## 2023-12-21 PROCEDURE — 1200000000 HC SEMI PRIVATE

## 2023-12-21 PROCEDURE — 99406 BEHAV CHNG SMOKING 3-10 MIN: CPT | Performed by: INTERNAL MEDICINE

## 2023-12-21 PROCEDURE — 94660 CPAP INITIATION&MGMT: CPT

## 2023-12-21 PROCEDURE — 97535 SELF CARE MNGMENT TRAINING: CPT

## 2023-12-21 PROCEDURE — 2700000000 HC OXYGEN THERAPY PER DAY

## 2023-12-21 PROCEDURE — 97162 PT EVAL MOD COMPLEX 30 MIN: CPT

## 2023-12-21 RX ORDER — PREDNISONE 20 MG/1
40 TABLET ORAL DAILY
Status: DISCONTINUED | OUTPATIENT
Start: 2023-12-22 | End: 2023-12-22 | Stop reason: HOSPADM

## 2023-12-21 RX ADMIN — ENOXAPARIN SODIUM 30 MG: 100 INJECTION SUBCUTANEOUS at 10:10

## 2023-12-21 RX ADMIN — IPRATROPIUM BROMIDE AND ALBUTEROL SULFATE 1 DOSE: 2.5; .5 SOLUTION RESPIRATORY (INHALATION) at 23:29

## 2023-12-21 RX ADMIN — IPRATROPIUM BROMIDE AND ALBUTEROL SULFATE 1 DOSE: 2.5; .5 SOLUTION RESPIRATORY (INHALATION) at 19:50

## 2023-12-21 RX ADMIN — MUPIROCIN: 20 OINTMENT TOPICAL at 10:07

## 2023-12-21 RX ADMIN — METHYLPREDNISOLONE SODIUM SUCCINATE 40 MG: 40 INJECTION INTRAMUSCULAR; INTRAVENOUS at 10:07

## 2023-12-21 RX ADMIN — IPRATROPIUM BROMIDE AND ALBUTEROL SULFATE 1 DOSE: 2.5; .5 SOLUTION RESPIRATORY (INHALATION) at 11:32

## 2023-12-21 RX ADMIN — MUPIROCIN: 20 OINTMENT TOPICAL at 21:06

## 2023-12-21 RX ADMIN — AZITHROMYCIN MONOHYDRATE 500 MG: 500 INJECTION, POWDER, LYOPHILIZED, FOR SOLUTION INTRAVENOUS at 10:02

## 2023-12-21 RX ADMIN — Medication 10 ML: at 21:06

## 2023-12-21 RX ADMIN — CEFTRIAXONE SODIUM 1000 MG: 1 INJECTION, POWDER, FOR SOLUTION INTRAMUSCULAR; INTRAVENOUS at 10:14

## 2023-12-21 RX ADMIN — IPRATROPIUM BROMIDE AND ALBUTEROL SULFATE 1 DOSE: 2.5; .5 SOLUTION RESPIRATORY (INHALATION) at 07:48

## 2023-12-21 RX ADMIN — IPRATROPIUM BROMIDE AND ALBUTEROL SULFATE 1 DOSE: 2.5; .5 SOLUTION RESPIRATORY (INHALATION) at 15:18

## 2023-12-21 NOTE — PROGRESS NOTES
Patient provided a COPD Educational Folder that includes the following materials:     [x]  [address]: Managing your COPD  [x]  ALA: Getting the Most Out of Medication Delivery Devices  [x]  ALA: My COPD Action Plan  [x]  Better Breathers [facility]: [facility]   [x]  Smoking Cessation Classes  [x]  Outpatient Spiritual Care Services  [x]  Magnet: Signs of COPD    PATIENT/CAREGIVER TEACHING:   Level of patient/caregiver understanding able to:   [x] Verbalize understanding   [] Demonstrate understanding       [] Teach back        [] Needs reinforcement     []  Other:     COPD ASSESSMENT TOOL (CAT):   Cough Assessment: 2   Phlegm Assessment: 0   Chest tightness:  2   Walking on an incline: 5   Home Activities: 5   Confident Leaving the Home: 4   Sleeping Soundly: 1   Have Energy: 5   Assessment Score: 24    CAT score of 24 completed by RT. Provided patient with COPD resource folder. Current every day smoker for 60 years. Patient is not interested in quitting at this time. Patient is using nicotine patches but stated they do not work. Patient plans on discharging to SNF at discharge. Offered other resources when discharged from SNF: [employer] and Outpatient pulmonary rehab. Patient is from [facility] and would have to find rehab closer to home. Sister will transport patient and his mother to appointments.      Electronically signed by Pham Mejia RN on 12/21/2023 at 9:40 AM

## 2023-12-21 NOTE — PROGRESS NOTES
pt  Pt. Lives     with family (mother)  Home environment:    mobile home/trailer  Steps to enter first floor:   3 steps to enter front, ramp entry through back  Steps to second floor/basement: N/A  Laundry:     1st floor  Bathroom:     tub/shower unit, grab bars in shower, and standard height toilet  Pt sleeps in a:    Flat bed  Equipment owned:    [facility] and home O2 (? L) PRN - pt unable to report how many L O2 he uses at home    Preadmission Status:  Pt. Able to drive:    No  Pt. Fully independent with ADLs:  Yes  Pt. Required assistance for: Independent PTA  Pt. independent for functional transfers and utilized No Device for mobility in home and No Device out in community; reports he forgets to take his canes with him  History of falls: Yes \"a few times\", no injuries reported  Home Health Services:  None    Objective:  Does this pt have an acute or acute on chronic diagnosis of CHF? No    Upper Extremity ROM:    Appears WFL's    Dominant Hand: Right/Left    Upper Extremity Strength:    BUE strength impaired but not formally assessed w/ MMT    Upper Extremity Sensation:    NT    Upper Extremity Proprioception:  NT    Coordination:  NT    Tone:  NT    Balance:  Sitting:    CGA  Standing: Mod A ; LOB noted in all directions    Bed Mobility:   Supine to Sit:    Min A   Sit to Supine:   SBA  Rolling:   Min A   Scooting in sitting: Min A   Scooting in supine:  Not Tested    Transfers:    Sit to stand: Mod A   Stand to sit:    Mod A   Bed to chair:     Not Tested  Bed/ chair to standard toilet:  Not Tested  Bed/chair to [facility]:   Not Tested  Functional Mobility:   Mod A with gait belt; pt noted to have multiple losses of balance during ambulation, requiring therapist assist to correct    See PT note for gait analysis.     ADLs:  Dressing:      UE:   Not Tested  LE:    CGA to don socks while seated at EOB    Bathing:    UE:  Not Tested  LE:  Not Tested    Eating:   Not Tested    Toileting:  Not Tested    Grooming/hygiene: Not Tested    Activity Tolerance:   Pt completed therapy session with no adverse symptoms     BP (mmHg) HR (bpm) SpO2 (%) on 2L Comments   Supine at rest  94 96    Seated at EOB       Standing       End of session         Positioning Needs:   Pt in bed, alarm set, call light provided and all needs within reach . Ther Ex / Activities Initiated:   N/A    Patient/Family Education:   Pt educated on role of inpatient OT, plan of care, importance of continued activity, DC recommendations and Calling for assist with mobility. CHF Education  N/A    Assessment:  Pt seen for Occupational therapy evaluation in acute care setting. Pt demonstrated decreased Activity tolerance, ADLs, Balance , Bathing, Bed mobility, Dressing, Safety Awareness, Strength, and Transfers. Pt functioning below baseline and will likely benefit from skilled occupational therapy services to maximize safety and independence. Pt demonstrating deficits in standing balance and endurance, which in turn impacts ability to safely complete standing level ADLs. Pt required mod A to maintain balance during standing and ambulation activities. Recommending SNF upon discharge as patient functioning well below baseline, demonstrates good rehab potential and unable to return home due to burden of care beyond caregiver ability, home environment not conducive to patient recovery, and limited safety awareness. Goal(s) :    To be met in 3 Visits:  Bed to toilet/BSC:       CGA  Pt will complete 3/3 CHF goals     N/A    To be met in 5 Visits:  Supine to/from Sit in preparation for ADL task:   Supervision  Toileting        Min A  Grooming       Supervision  Upper Body Dressing:      Supervision  Lower Body Dressing:      Min A  Pt to demonstrate UE therapeutic exs x 15 reps with minimal cues    Rehabilitation Potential: Good  Strengths for achieving goals include: PLOF and Pt cooperative   Barriers to achieving goals include:

## 2023-12-21 NOTE — PROGRESS NOTES
12/21/23 0700   COPD Assessment (CAT Score)   Cough Assessment 2   Phlegm Assessment 0   Chest tightness 2   Walking on an incline 5   Home Activities 5   Confident Leaving The Home 4   Sleeping Soundly 1   Have Energy 5   Assessment Score 24   $RT COPD Assessment Yes

## 2023-12-21 NOTE — PROGRESS NOTES
Inpatient Physical Therapy Evaluation & Treatment    Unit: 2 [address] [address]  Date:  12/21/2023  Patient Name:    Raymundo Whittington  Admitting diagnosis:  Acute on chronic respiratory failure [facility]) [J96.20]  Admit Date:  12/19/2023  Precautions/Restrictions/WB Status/ Lines/ Wounds/ Oxygen: Fall risk, Bed/chair alarm, Lines (IV, Supplemental O2 (2L), and internal catheter), Confusion, Telemetry, Telesitter, and Isolation Precautions: Droplet      Pt seen for cotreatment this date due to patient safety, complexity of condition, acute illness/injury, cognitive status, behavioral concerns, and need for the assistance of 2 skilled therapists    Treatment Time:  4271-0008  Treatment Number:  1   Timed Code Treatment Minutes: 40 minutes  Total Treatment Minutes:  30  minutes    Patient Stated Goals for Therapy: \" none stated due to confusion  \"          Discharge Recommendations: SNF  DME needs for discharge: Defer to facility       Therapy recommendation for EMS Transport: can transport by wheelchair    Therapy recommendations for staff:   Assist of 1 for stand-pivot transfers with gait belt to/from chair    History of Present Illness:   Per Dr. Casillas Board H&P:  \"74 y.o. male with pmh of COPD , on home oxygen as needed per report patient was found slumped over in his room minimally responsive patient lives at home with family unknown downtime EMS found the patient to have saturation 77% on nasal cannula came up to 90% on CPAP patient was given 1.5 mg of Medrol in route patient's cannot give history family had noticed the patient had been ill or febrile patient family has not noticed\"  Diagnosed with acute on chronic respiratory failure.      Home Health S4 Level Recommendation:  NA        [facility] 6 Clicks Inpatient Mobility:  AM-PAC Basic Mobility - Inpatient   How much help is needed turning from your back to your side while in a flat bed without using bedrails?: A Little  How much help is needed moving from lying on your back to sitting on independence with Ambulation. Pt pleasantly confused but agreeable to therapy evaluation. Pt required min A for bed mobility and mod A for ambulation. Very unsteady needing 2 HHA. Poor awareness of deficits. Returned to bed at [facility]. Recommending SNF upon discharge as patient functioning well below baseline, demonstrates good rehab potential and unable to return home due to limited or no family support, inability to negotiate stairs to enter home/bedroom/bathroom, burden of care beyond caregiver ability, home environment not conducive to patient recovery, and limited safety awareness. Goals : To be met in 3 visits:  1). Independent with LE Ex x 10 reps  2). Sit to/from stand: CGA  3). Bed to chair: CGA  4). CHF goal: N/A    To be met in 6 visits:  1). Supine to/from sit: Modified Independent  2). Sit to/from stand: Modified Independent  3). Bed to chair: Modified Independent  4). Gait: Ambulate 50 ft.  with CGA and use of No AD  5). Tolerate B LE exercises 3 sets of 10-15 reps  6). Ascend/descend 3 steps with Min A  with use of hand rail unilateral and Single point cane     Rehabilitation Potential: Fair  Strengths for achieving goals include:   Pt cooperative   Barriers to achieving goals include:    Complexity of condition and Impaired cognition    Plan    To be seen 3-5 x / week  while in acute care setting for therapeutic exercises, bed mobility, transfers, progressive gait training, balance training, and family/patient education. Signature: Lory Newsome PT     If patient discharges from this facility prior to next visit, this note will serve as the Discharge Summary.     CHF Education  N/A

## 2023-12-21 NOTE — PROGRESS NOTES
12/20/23 6419   NIV Type   NIV Started/Stopped (S)  On   Equipment Type V60   Mode Bilevel   Mask Type Full face mask   Mask Size Medium   Assessment   Pulse 60   Respirations 16   SpO2 98 %   Level of Consciousness 0   Comfort Level Good   Using Accessory Muscles No   Mask Compliance Good   Skin Assessment Clean, dry, & intact   Skin Protection for O2 Device Yes   Settings/Measurements   IPAP 18 cmH20   CPAP/EPAP 8 cmH2O   Vt (Measured) 856 mL   Rate Ordered 16   FiO2  30 %   Minute Volume (L/min) 14 Liters   Mask Leak (lpm) 29 lpm   Patient's Home Machine No   Alarm Settings   Alarms On Y

## 2023-12-21 NOTE — CARE COORDINATION
SNF Referral     Referral called to Stephen Felder @ [facility] and [address]. Faxed face sheet, H/P, MAR and therapy notes for review:   Faxed to [phone]  Bed available?: TBD  Insurance precertification required? YES  Continuity of Care Form (SHANDRA) initiated? No   Hospital Exemption Notification (HENS) initiated? No    Initiate Level of Care (LOC), if Medicaid is the primary payer?  TBD

## 2023-12-21 NOTE — PROGRESS NOTES
12/21/23 0245   NIV Type   Equipment Type V60   Mode Bilevel   Mask Type Full face mask   Mask Size Medium   Assessment   Pulse 62   Respirations 17   SpO2 96 %   Settings/Measurements   IPAP 18 cmH20   CPAP/EPAP 8 cmH2O   Vt (Measured) 656 mL   Rate Ordered 16   FiO2  30 %   Minute Volume (L/min) 10.5 Liters   Mask Leak (lpm) 34 lpm   Patient's Home Machine No   Alarm Settings   Alarms On Y

## 2023-12-22 ENCOUNTER — APPOINTMENT (OUTPATIENT)
Dept: GENERAL RADIOLOGY | Age: 68
DRG: 871 | End: 2023-12-22
Attending: INTERNAL MEDICINE
Payer: MEDICAID

## 2023-12-22 VITALS
WEIGHT: 113.7 LBS | HEART RATE: 61 BPM | BODY MASS INDEX: 17.23 KG/M2 | SYSTOLIC BLOOD PRESSURE: 146 MMHG | HEIGHT: 68 IN | RESPIRATION RATE: 16 BRPM | OXYGEN SATURATION: 97 % | TEMPERATURE: 97.9 F | DIASTOLIC BLOOD PRESSURE: 74 MMHG

## 2023-12-22 LAB
ANION GAP SERPL CALCULATED.3IONS-SCNC: 7 MMOL/L (ref 3–16)
BASOPHILS # BLD: 0.1 K/UL (ref 0–0.2)
BASOPHILS NFR BLD: 1.1 %
BUN SERPL-MCNC: 16 MG/DL (ref 7–20)
CALCIUM SERPL-MCNC: 8.5 MG/DL (ref 8.3–10.6)
CHLORIDE SERPL-SCNC: 96 MMOL/L (ref 99–110)
CO2 SERPL-SCNC: 32 MMOL/L (ref 21–32)
CREAT SERPL-MCNC: <0.5 MG/DL (ref 0.8–1.3)
DEPRECATED RDW RBC AUTO: 16.1 % (ref 12.4–15.4)
EOSINOPHIL # BLD: 0 K/UL (ref 0–0.6)
EOSINOPHIL NFR BLD: 0.2 %
GFR SERPLBLD CREATININE-BSD FMLA CKD-EPI: >60 ML/MIN/{1.73_M2}
GLUCOSE SERPL-MCNC: 79 MG/DL (ref 70–99)
HCT VFR BLD AUTO: 30.2 % (ref 40.5–52.5)
HGB BLD-MCNC: 10 G/DL (ref 13.5–17.5)
LYMPHOCYTES # BLD: 2 K/UL (ref 1–5.1)
LYMPHOCYTES NFR BLD: 16.3 %
MCH RBC QN AUTO: 30 PG (ref 26–34)
MCHC RBC AUTO-ENTMCNC: 33.1 G/DL (ref 31–36)
MCV RBC AUTO: 90.8 FL (ref 80–100)
MONOCYTES # BLD: 1 K/UL (ref 0–1.3)
MONOCYTES NFR BLD: 7.9 %
NEUTROPHILS # BLD: 9.1 K/UL (ref 1.7–7.7)
NEUTROPHILS NFR BLD: 74.5 %
PLATELET # BLD AUTO: 542 K/UL (ref 135–450)
PMV BLD AUTO: 7.2 FL (ref 5–10.5)
POTASSIUM SERPL-SCNC: 4.2 MMOL/L (ref 3.5–5.1)
RBC # BLD AUTO: 3.32 M/UL (ref 4.2–5.9)
SODIUM SERPL-SCNC: 135 MMOL/L (ref 136–145)
WBC # BLD AUTO: 12.2 K/UL (ref 4–11)

## 2023-12-22 PROCEDURE — 6360000002 HC RX W HCPCS: Performed by: INTERNAL MEDICINE

## 2023-12-22 PROCEDURE — 99233 SBSQ HOSP IP/OBS HIGH 50: CPT | Performed by: INTERNAL MEDICINE

## 2023-12-22 PROCEDURE — 6370000000 HC RX 637 (ALT 250 FOR IP): Performed by: INTERNAL MEDICINE

## 2023-12-22 PROCEDURE — 94640 AIRWAY INHALATION TREATMENT: CPT

## 2023-12-22 PROCEDURE — 36415 COLL VENOUS BLD VENIPUNCTURE: CPT

## 2023-12-22 PROCEDURE — 2700000000 HC OXYGEN THERAPY PER DAY

## 2023-12-22 PROCEDURE — 94761 N-INVAS EAR/PLS OXIMETRY MLT: CPT

## 2023-12-22 PROCEDURE — 2580000003 HC RX 258: Performed by: INTERNAL MEDICINE

## 2023-12-22 PROCEDURE — 99239 HOSP IP/OBS DSCHRG MGMT >30: CPT | Performed by: INTERNAL MEDICINE

## 2023-12-22 PROCEDURE — 71046 X-RAY EXAM CHEST 2 VIEWS: CPT

## 2023-12-22 PROCEDURE — 94660 CPAP INITIATION&MGMT: CPT

## 2023-12-22 PROCEDURE — 80048 BASIC METABOLIC PNL TOTAL CA: CPT

## 2023-12-22 PROCEDURE — 85025 COMPLETE CBC W/AUTO DIFF WBC: CPT

## 2023-12-22 RX ORDER — HYDROCODONE BITARTRATE AND ACETAMINOPHEN 5; 325 MG/1; MG/1
1 TABLET ORAL EVERY 6 HOURS PRN
Qty: 12 TABLET | Refills: 0 | Status: SHIPPED | OUTPATIENT
Start: 2023-12-22 | End: 2023-12-25

## 2023-12-22 RX ORDER — IPRATROPIUM BROMIDE AND ALBUTEROL SULFATE 2.5; .5 MG/3ML; MG/3ML
3 SOLUTION RESPIRATORY (INHALATION)
Qty: 360 ML | Refills: 0
Start: 2023-12-22

## 2023-12-22 RX ORDER — NICOTINE 21 MG/24HR
1 PATCH, TRANSDERMAL 24 HOURS TRANSDERMAL DAILY
Qty: 30 PATCH | Refills: 3
Start: 2023-12-22

## 2023-12-22 RX ORDER — CEFDINIR 300 MG/1
300 CAPSULE ORAL 2 TIMES DAILY
Qty: 10 CAPSULE | Refills: 0
Start: 2023-12-22 | End: 2023-12-27

## 2023-12-22 RX ORDER — PREDNISONE 10 MG/1
TABLET ORAL
Qty: 30 TABLET | Refills: 0
Start: 2023-12-22

## 2023-12-22 RX ADMIN — IPRATROPIUM BROMIDE AND ALBUTEROL SULFATE 1 DOSE: 2.5; .5 SOLUTION RESPIRATORY (INHALATION) at 11:33

## 2023-12-22 RX ADMIN — IPRATROPIUM BROMIDE AND ALBUTEROL SULFATE 1 DOSE: 2.5; .5 SOLUTION RESPIRATORY (INHALATION) at 07:34

## 2023-12-22 RX ADMIN — SODIUM CHLORIDE: 9 INJECTION, SOLUTION INTRAVENOUS at 09:32

## 2023-12-22 RX ADMIN — IPRATROPIUM BROMIDE AND ALBUTEROL SULFATE 1 DOSE: 2.5; .5 SOLUTION RESPIRATORY (INHALATION) at 15:16

## 2023-12-22 RX ADMIN — PREDNISONE 40 MG: 20 TABLET ORAL at 09:42

## 2023-12-22 RX ADMIN — CEFTRIAXONE SODIUM 1000 MG: 1 INJECTION, POWDER, FOR SOLUTION INTRAMUSCULAR; INTRAVENOUS at 09:35

## 2023-12-22 NOTE — CARE COORDINATION
CM UPDATE:    DC order noted. Pt accepted @:  [address] [place] [address] Drive  [address] [place], [place] [address]Th [facility], [address] [place] [address] [phone]  Fax [phone]    Pt can come under [facility] for at least 30 days  Facility has ordered bipap. Pt may be scheduled for transport once bipap is confirmed. All Orders and Bipap settings faxed to facility.

## 2023-12-22 NOTE — PLAN OF CARE
Problem: Discharge Planning  Goal: Discharge to home or other facility with appropriate resources  Outcome: Completed  Flowsheets (Taken 12/22/2023 2876)  Discharge to home or other facility with appropriate resources:   Identify barriers to discharge with patient and caregiver   Arrange for needed discharge resources and transportation as appropriate   Identify discharge learning needs (meds, wound care, etc)   Arrange for interpreters to assist at discharge as needed   Refer to discharge planning if patient needs post-hospital services based on physician order or complex needs related to functional status, cognitive ability or social support system     Problem: Safety - Adult  Goal: Free from fall injury  Outcome: Completed     Problem: Nutrition Deficit:  Goal: Optimize nutritional status  Outcome: Completed     Problem: Respiratory - Adult  Goal: Achieves optimal ventilation and oxygenation  Outcome: Completed  Flowsheets (Taken 12/22/2023 5898)  Achieves optimal ventilation and oxygenation:   Assess for changes in respiratory status   Assess for changes in mentation and behavior   Position to facilitate oxygenation and minimize respiratory effort   Oxygen supplementation based on oxygen saturation or arterial blood gases   Initiate smoking cessation protocol as indicated   Assess the need for suctioning and aspirate as needed   Encourage broncho-pulmonary hygiene including cough, deep breathe, incentive spirometry   Assess and instruct to report shortness of breath or any respiratory difficulty   Respiratory therapy support as indicated

## 2023-12-22 NOTE — PROGRESS NOTES
Patient discharged per stretcher per ambulance, all belongings sent with SHANDRA, one prescription and AVS.

## 2023-12-22 NOTE — PROGRESS NOTES
Physician Progress Note      Laura Duke  [facility] #:                  [id number]  :                       1955  ADMIT DATE:       2023 7:23 PM  [facility] DATE:  RESPONDING  PROVIDER #:        Abdias Witt MD          QUERY TEXT:    Patient with dietician consult and the progress note on  states patient   meets criteria for severe malnutrition. If possible, please document in   progress notes and discharge summary if you are evaluating and /or treating   any of the following: The medical record reflects the following:  Risk Factors: Pneumonia, copd,  inadequate protein-energy intake, impaired   respiratory function, psychological cause or life stress  Clinical Indicators:  evidenced by poor intake prior to admission, BMI, intake   0-25%, severe loss of subcutaneous fat, severe muscle loss, lab values  Treatment:  dietician consult, Continue ADULT DIET; Regular; Low Potassium   diet order. Added Ensure Compact with meals. Monitor appetite, meal intake,   and acceptance/intake of ONS. Monitor nutrition-related labs, bowel function,   and weight trends. Thank you,  Renny Baldwin RN,BSN,CCDS,CRCR  Options provided:  -- Protein calorie malnutrition severe  -- Other - I will add my own diagnosis  -- Disagree - Not applicable / Not valid  -- Disagree - Clinically unable to determine / Unknown  -- Refer to Clinical Documentation Reviewer    PROVIDER RESPONSE TEXT:    This patient has severe protein calorie malnutrition. Query created by: Renny Baldwin on 2023 9:29 AM      QUERY TEXT:    Pt admitted with CAP. Pt noted to have WBC 35.2, Procalcitonin 0.54, ,   RR 28 with acute respiratory failure. If possible, please document in the   progress notes and discharge summary if you are evaluating and /or treating   any of the following:     The medical record reflects the following:  Risk Factors: pneumonia, age, copd, severe malnutrition  Clinical Indicators:  WBC 35.2, Procalcitonin 0.54, , RR 28 with acute   respiratory failure. Treatment: IV vancomycin and Zosyn then changed to Zithromax and Rocephin,   Blood cultures, BiPAP, lactic, pct, 500cc IV bolus    Thank you,  Mira Denson RN,BSN,CCDS,CRCR  Options provided:  -- Sepsis due to pneumonia, present on admission  -- No Sepsis  -- Other - I will add my own diagnosis  -- Disagree - Not applicable / Not valid  -- Disagree - Clinically unable to determine / Unknown  -- Refer to Clinical Documentation Reviewer    PROVIDER RESPONSE TEXT:    This patient has sepsis due to pneumonia which was present on admission.     Query created by: Carlos Milian on 12/21/2023 9:34 AM      Electronically signed by:  Raoul Osorio MD 12/22/2023 8:28 AM

## 2023-12-22 NOTE — PLAN OF CARE
Problem: Safety - Adult  Goal: Free from fall injury  12/21/2023 2107 by Melina Combs RN  Outcome: Progressing  12/21/2023 1743 by Mardell Cranker, RN  Outcome: Progressing     Problem: Nutrition Deficit:  Goal: Optimize nutritional status  12/21/2023 2107 by Melina Combs RN  Outcome: Progressing  12/21/2023 1743 by Mardell Cranker, RN  Outcome: Progressing     Problem: Respiratory - Adult  Goal: Achieves optimal ventilation and oxygenation  12/21/2023 2107 by Melina Combs RN  Outcome: Progressing  12/21/2023 1743 by Mardell Cranker, RN  Outcome: Progressing

## 2023-12-22 NOTE — PROGRESS NOTES
Sykes cath discontinued as ordered without difficulty, 400 ml of clear yellow urine in bag, will continue to monitor.

## 2023-12-22 NOTE — CARE COORDINATION
DISCHARGE ORDER  Date/Time 2023 2:16 PM  Completed by: Caryle Miner, RN, Case Management    Patient Name: Alexandria Moraes    : 1955      Admit order Date and Status: IP 2023  Noted discharge order. (verify MD's last order for status of admission/Traditional Medicare 3 [place] Inpatient qualifying stay required for SNF)    Confirmed discharge plan with:              Patient:  Yes                                  Discharge to Facility:     [facility]  [address] [place] [address]  RN report: [phone]  Fax [phone]  Facility phone number for staff giving report: NA   Pre-certification completed: [facility] Exemption Notification (HENS) completed: NA   Discharge orders and Continuity of Care faxed to facility:  see above      Transportation:               Medical Transport explained with choice list offered to pt/family. Choice: Per Round trip (no preference)  Agency used: [address] [place] [address] up time:  16:00 PM      Pt/family/Nursing/Facility aware of  time:   Pt/family  Nurse: mAy Higgins ([address] [place] [address])    Ambulance form completed: YES     Date Last IMM Given: NA    Comments:    Pt is being d/c'd to [facility] today. Pt's O2 sats are 97% on RA. Discharge timeout done with Jean Cary. All discharge needs and concerns addressed. Discharging nurse to complete SHANDRA, reconcile AVS, and place final copy with patient's discharge packet. Discharging RN to ensure that written prescriptions for  Level II medications are sent with patient to the facility as per protocol.

## 2023-12-22 NOTE — PROGRESS NOTES
Writer called report to Latrice Gaytan and Zac Harris nurse their unable to take report nurse to call writer back.

## 2023-12-22 NOTE — PROGRESS NOTES
Writer called report Janet Goldman at [facility] and [employer]

## 2023-12-22 NOTE — PROGRESS NOTES
Bedside report given to [facility]. Pt denies needs at this time. No s/s of distress. Respirations easy and unlabored. Pt stable. Bed in low position call light within reach. No further needs at this time. Tele sitter in room. Pt denies any SOB or pain at this time.

## 2023-12-22 NOTE — PROGRESS NOTES
Pt A&Ox4, awake, watching TV, pt denies any complaints. Shift assessment completed, vss,, PM medications given, bed alarm on, tele sitter in room for safety, call light in reach.

## 2023-12-23 LAB
BACTERIA BLD CULT ORG #2: NORMAL
BACTERIA BLD CULT: NORMAL

## 2024-05-21 NOTE — PROGRESS NOTES
Anesthesia Pre Eval Note    Anesthesia ROS/Med Hx    Overall Review:  EKG was reviewed     Anesthetic Complication History:    Patient does not have a history of anesthetic complications      Pulmonary Review:    Positive for asthma  The patient is a former smoker.     Neuro/Psych Review:   Comments: Sensorineural hearing loss, bilateral  Spinal stenosis of lumbar region with radiculopathy          Cardiovascular Review:   Exercise tolerance: good (>4 METS)  Positive for CHF  Positive for CAD  Positive for CABG     # of Vessels - 4  Positive for hypertension  Positive for hyperlipidemia    GI/HEPATIC/RENAL Review:     Positive for renal disease    End/Other Review:  Comments: PND (post-nasal drip) [R09.82]       Nasal valve collapse [M95.0]       Rhinitis, chronic [J31.0]       Nasal congestion [R09.81]       Hypertrophy of inferior nasal turbinate [J34.3]       DNS (deviated nasal septum) [J34.2]       Nasal obstruction     Positive for obesity class I - 30.00 - 34.99  Positive for cancer  Additional Results:  EKG:  No results found for this or any previous visit (from the past 4464 hour(s)).     Relevant Problems   No relevant active problems       Physical Exam     Airway   Mallampati: II  TM Distance: <3 FB  Neck ROM: Full  Neck: Patient has a beard  TMJ Mobility: Good    Cardiovascular  Cardiovascular exam normal  Cardio Rhythm: Regular  Cardio Rate: Normal    General Assessment  General Assessment: Alert and oriented    Dental Exam  Dental exam normal    Pulmonary Exam  Pulmonary exam normal  Breath sounds clear to auscultation:  Yes    Abdominal Exam    Patient Demonstrates:  Obese    Anesthesia Plan:    ASA Status: 3  Anesthesia Type: General    Induction: Intravenous  Maintenance: Inhalational  Premedication: None      Post-op Pain Management: Per Surgeon      Checklist  Reviewed: Lab Results, Patient Summary, Allergies, EKG, Medications, Problem list, Past Med History, Anesthesia Record, DNR Status and NPO  Prevention  dressing applied to bridge of nose and other facial bony prominences upon BiPAP/CPAP initiation. Consulted RN regarding the assessment of skin integrity. Status  Consent/Risks Discussed Statement:  The proposed anesthetic plan, including its risks and benefits, have been discussed with the Patient along with the risks and benefits of alternatives. Questions were encouraged and answered and the patient and/or representative understands and agrees to proceed.    I have discussed elements of the patient's history or examination, as noted above and/or as follows, that place the patient at higher risk of complications; age, BMI> 30 (obesity), heart disease and neurological disease.    I discussed with the patient (and/or patient's legal representative) the risks and benefits of the proposed anesthesia plan, General, which may include services performed by other anesthesia providers.    Alternative anesthesia plans, if available, were reviewed with the patient (and/or patient's legal representative). Discussion has been held with the patient (and/or patient's legal representative) regarding risks of anesthesia, which include allergic reaction, dental injury, nausea, sore throat and vomiting and emergent situations that may require change in anesthesia plan.    The patient (and/or patient's legal representative) has indicated understanding, his/her questions have been answered, and he/she wishes to proceed with the planned anesthetic.    Blood Products: Not Anticipated

## 2024-08-21 NOTE — PROGRESS NOTES
Bedside report given to Arlene Martinez RN. POC transferred. Pt taken via bed on 2L/NC by transport in bed with all belongings.  Viktor Melton Daily Call Note:     Daily call completed.  Spoke with Amada, pt's daughter and she states pt is doing good.  Her /74 this morning.  She is set up on Masimo and transmitting on the monitor.  Asked how long her Mother needs to be on.  Next [facility] on 8/23 and advised to address the provider on that call.  No add'l questions or concerns.      Pt Education:   Barriers:   Topics for Daily Review:   Pt demonstrates clear understanding:    Daily Weight:  There were no vitals filed for this visit.   Last 3 Weights:  Wt Readings from Last 7 Encounters:   08/07/24 65.2 kg (143 lb 11.8 oz)       Masimo Device:    Masimo Clinical Impression:     Virtual Visits--Scheduled (Most Recent Date at Top)  Follow up Appointments  Recent Visits  No visits were found meeting these conditions.  Showing recent visits within past 30 days and meeting all other requirements  Future Appointments  No visits were found meeting these conditions.  Showing future appointments within next 90 days and meeting all other requirements       Frequency of RN Calls & Virtual Visits per Team Agreement:    Medication issues Addressed (what was done):     Follow up appointments scheduled by [facility] Staff:   Referrals made by [facility] staff: